# Patient Record
Sex: MALE | Race: WHITE | NOT HISPANIC OR LATINO | Employment: OTHER | URBAN - METROPOLITAN AREA
[De-identification: names, ages, dates, MRNs, and addresses within clinical notes are randomized per-mention and may not be internally consistent; named-entity substitution may affect disease eponyms.]

---

## 2024-04-09 ENCOUNTER — OFFICE VISIT (OUTPATIENT)
Dept: DERMATOLOGY | Facility: CLINIC | Age: 44
End: 2024-04-09
Payer: OTHER GOVERNMENT

## 2024-04-09 DIAGNOSIS — Z02.1 PRE-EMPLOYMENT EXAMINATION: Primary | ICD-10-CM

## 2024-04-09 PROCEDURE — 99202 OFFICE O/P NEW SF 15 MIN: CPT

## 2024-04-09 NOTE — PROGRESS NOTES
Subjective   HPI: Pastor Headley is a 44 y.o. male new patient who presents in office to be evaluated and medically cleared for the .     ROS: No other skin or systemic complaints other than what is documented elsewhere in the note.  Review of Systems   Constitutional: Negative.   HENT: Negative.     Eyes: Negative.    Cardiovascular: Negative.    Respiratory: Negative.     Endocrine: Negative.    Hematologic/Lymphatic: Negative.    Skin: Negative.    Musculoskeletal: Negative.    Gastrointestinal: Negative.    Genitourinary: Negative.    Neurological: Negative.    Psychiatric/Behavioral: Negative.         ALLERGIES: Patient has no allergy information on record.    SOCIAL:  has no history on file for tobacco use, alcohol use, and drug use.    Objective   No acute skin findings today.    Assessment/Plan   Patient has previously had a diagnosis by an outside provider of hidradentitis and was given a dermatology referral for evaluation. Patient was seen by  department of dermatology 1/15/2018 Dr. Abhinav Whipple MD and was diagnosed with Chloracne.  He was treated with benzyl peroxide 5% topical cleanser as well as clindamycin 1% lotion and patient has no longer had any recurrences.  Patient does not have the diagnosis of hidradenitis suppurativa.  From a dermatology standpoint he is medically cleared.      FOLLOW UP: As needed    The patient was encouraged to contact me with any further questions or concerns.  Jessica Nava PA-C  4/11/2024

## 2024-04-09 NOTE — LETTER
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  Patient does not have a diagnosis of hidradenitis and there are no other concerning skin findings today on examination.    If you have any questions or concerns, please don't hesitate to call at [phone].         Sincerely,        DICK Chavarria MD

## 2024-04-11 ASSESSMENT — ENCOUNTER SYMPTOMS
CARDIOVASCULAR NEGATIVE: 1
GASTROINTESTINAL NEGATIVE: 1
CONSTITUTIONAL NEGATIVE: 1
RESPIRATORY NEGATIVE: 1
EYES NEGATIVE: 1
ENDOCRINE NEGATIVE: 1
HEMATOLOGIC/LYMPHATIC NEGATIVE: 1
MUSCULOSKELETAL NEGATIVE: 1
NEUROLOGICAL NEGATIVE: 1
PSYCHIATRIC NEGATIVE: 1

## 2024-04-12 ENCOUNTER — HOSPITAL ENCOUNTER (OUTPATIENT)
Dept: RADIOLOGY | Facility: HOSPITAL | Age: 44
Discharge: HOME | End: 2024-04-12
Payer: OTHER GOVERNMENT

## 2024-04-12 ENCOUNTER — OFFICE VISIT (OUTPATIENT)
Dept: PRIMARY CARE | Facility: CLINIC | Age: 44
End: 2024-04-12
Payer: OTHER GOVERNMENT

## 2024-04-12 ENCOUNTER — OFFICE VISIT (OUTPATIENT)
Dept: ORTHOPEDIC SURGERY | Facility: HOSPITAL | Age: 44
End: 2024-04-12
Payer: OTHER GOVERNMENT

## 2024-04-12 VITALS
SYSTOLIC BLOOD PRESSURE: 118 MMHG | WEIGHT: 190 LBS | HEIGHT: 72 IN | BODY MASS INDEX: 25.73 KG/M2 | TEMPERATURE: 96.4 F | DIASTOLIC BLOOD PRESSURE: 72 MMHG

## 2024-04-12 VITALS — HEIGHT: 72 IN | WEIGHT: 186 LBS | BODY MASS INDEX: 25.19 KG/M2

## 2024-04-12 DIAGNOSIS — M25.521 RIGHT ELBOW PAIN: ICD-10-CM

## 2024-04-12 DIAGNOSIS — Z76.89 ESTABLISHING CARE WITH NEW DOCTOR, ENCOUNTER FOR: Primary | ICD-10-CM

## 2024-04-12 DIAGNOSIS — M25.521 RIGHT ELBOW PAIN: Primary | ICD-10-CM

## 2024-04-12 DIAGNOSIS — R91.8 LUNG NODULES: ICD-10-CM

## 2024-04-12 PROCEDURE — 73070 X-RAY EXAM OF ELBOW: CPT | Mod: RT

## 2024-04-12 PROCEDURE — 84443 ASSAY THYROID STIM HORMONE: CPT | Performed by: INTERNAL MEDICINE

## 2024-04-12 PROCEDURE — 73070 X-RAY EXAM OF ELBOW: CPT | Mod: RIGHT SIDE | Performed by: RADIOLOGY

## 2024-04-12 PROCEDURE — 99204 OFFICE O/P NEW MOD 45 MIN: CPT | Performed by: INTERNAL MEDICINE

## 2024-04-12 PROCEDURE — 80053 COMPREHEN METABOLIC PANEL: CPT | Performed by: INTERNAL MEDICINE

## 2024-04-12 PROCEDURE — 99203 OFFICE O/P NEW LOW 30 MIN: CPT | Performed by: ORTHOPAEDIC SURGERY

## 2024-04-12 PROCEDURE — 85027 COMPLETE CBC AUTOMATED: CPT

## 2024-04-12 PROCEDURE — 36415 COLL VENOUS BLD VENIPUNCTURE: CPT

## 2024-04-12 PROCEDURE — 80061 LIPID PANEL: CPT | Performed by: INTERNAL MEDICINE

## 2024-04-12 PROCEDURE — 99213 OFFICE O/P EST LOW 20 MIN: CPT | Performed by: ORTHOPAEDIC SURGERY

## 2024-04-12 ASSESSMENT — ENCOUNTER SYMPTOMS
OCCASIONAL FEELINGS OF UNSTEADINESS: 0
LOSS OF SENSATION IN FEET: 0
DEPRESSION: 0

## 2024-04-12 ASSESSMENT — PAIN SCALES - GENERAL: PAINLEVEL: 0-NO PAIN

## 2024-04-12 ASSESSMENT — PAIN - FUNCTIONAL ASSESSMENT: PAIN_FUNCTIONAL_ASSESSMENT: NO/DENIES PAIN

## 2024-04-12 NOTE — PROGRESS NOTES
Subjective   Patient ID: Pastor Headley is a 44 y.o. male who presents for Hypertension, Hypothyroidism, and Hyperlipidemia.    HPI   New patient.  44 years old male whom I saw in 2017 for the first time and I saw him now again this year as a new patient with a past medical history of lung nodules.  He had multiple CAT scans on the chest and PET scan and was seen by Dr. PATRICIA HAYWARD who left [facility] to go to [facility].  At that time we did not find any suspicion for cancer of the lung.  He was a cigarette smoker and I made him quit at that time.  Unfortunately today and since then he has been vaping.  Lives in [place] with his wife and 2 kids.  Allergic to all mild symptoms which makes him nauseous and dizzy.  Vaping and no alcohol.  On no prescription medication.  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  On reserve with [employer] he was asking questions about his long CAT scan and findings.  Review of Systems  12 system review reviewed 12 system negative.  He has no specific symptoms of any kind.  Had also issues with his tennis elbow in 2018 and that was cleared.  Objective   /72 (BP Location: Left arm, Patient Position: Sitting, BP Cuff Size: Adult)   Temp 35.8 °C (96.4 °F) (Temporal)   Ht 1.829 m (6')   Wt 86.2 kg (190 lb)   BMI 25.77 kg/m²     Physical Exam  Alert oriented in no distress pleasant cooperative.  Nonicteric sclera no jaundice.  Face symmetrical cranial nerves intact.  Neck supple no masses no lymph node no thyromegaly or jugular venous distention.  Lungs clear no rales wheezing or crackles.  Heart normal S1 and S2 regular rhythm.  Abdomen benign nontender no masses no organomegaly.  Neurological exam intact.  No signs of failure.  No dyspnea on exertion or no chest pain.  No symptoms or history of hemoptysis dyspnea on exertion shortness of breath or any chronic cough.  Agreed to repeat CAT scan of the lung as a follow-up on his lung nodule his last  CAT scan was done in 2019.  I have not seen him since  Assessment/Plan   Diagnoses and all orders for this visit:  Establishing care with new doctor, encounter for  -     Lipid Panel  -     Thyroid Stimulating Hormone  -     Comprehensive Metabolic Panel  -     CBC  Lung nodules  -     CT lung screening follow up CT chest wo IV contrast; Future

## 2024-04-12 NOTE — PROGRESS NOTES
44-year-old male presenting for right elbow evaluation.  He has previously been evaluated for a right elbow injury that was been previously documented as right elbow epicondylitis and tennis elbow.  He was treated back in 2018 and then later again a few years ago with an elbow strap per the records available.  Currently he is having no symptoms and has had no symptoms for years.  He would like his records to reflect that he is asymptomatic and has recovered from this prior to his  medical evaluation.  He has had no need for treatment in his right elbow.  He can lift and use his elbow fully and freely without restriction, pain, or dysfunction.    The patients full medical history, surgical history, medications, allergies, family, medical history, social history, and a complete 30 point review of systems is documented in the medical record on the signed, scanned medical intake sheet or reviewed in the history of present illness.    Gen: The patient is alert and oriented ×3, is in no acute distress, and appear their stated age and weight.    Psychiatric: Mood and affect are appropriate.    Eyes: Sclera are white, and pupils are round and symmetric.    ENT: Mucous membranes are moist.     Neck: Supple. Thyroid is midline.    Respiratory: Respirations are nonlabored, chest rise is symmetric.    Cardiac: Rate is regular by palpation of distal pulses.     Abdomen: Nondistended.    Integument: No obvious cutaneous lesions are noted. No signs of lymphangitis. No signs of systemic edema.    Musculoskeletal examination of his right elbow demonstrates no pain with passive or active range of motion.  Range of motion is full and symmetric to the contralateral side.  Strength is 5/5 in flexion and extension.  Sensation is intact to light touch in the axillary, median, ulnar, and radial nerve distributions distally. The hand is warm and well-perfused.      Multiple views of the right elbow were taken that demonstrate no  signs of elbow arthritis or any lateral elbow injury.    44-year-old male with a history of a remote right elbow injury.  Please let it reflects that this injury is fully recovered and resolved.  There is no need for any restriction medically for remote right elbow injury.    Natural History reviewed. All questions answered. The patient was in agreement with the plan.      **This note was created using voice recognition software and was not corrected for typographical or grammatical errors.**

## 2024-04-12 NOTE — LETTER
April 12, 2024     Patient: Pastor Headley   YOB: 1980   Date of Visit: 4/12/2024       To Whom It May Concern:    It is my medical opinion that Pastor Headley has no active orthopaedic medical issues (including right elbow lateral epicondylitis or tennis elbow) and has no medical restrictions.    If you have any questions or concerns, please don't hesitate to call.         Sincerely,        Theodore Willard MD    CC: No Recipients

## 2024-04-13 LAB
ERYTHROCYTE [DISTWIDTH] IN BLOOD BY AUTOMATED COUNT: 13 % (ref 11.5–14.5)
HCT VFR BLD AUTO: 40.6 % (ref 41–52)
HGB BLD-MCNC: 13.4 G/DL (ref 13.5–17.5)
MCH RBC QN AUTO: 30.2 PG (ref 26–34)
MCHC RBC AUTO-ENTMCNC: 33 G/DL (ref 32–36)
MCV RBC AUTO: 91 FL (ref 80–100)
NRBC BLD-RTO: 0 /100 WBCS (ref 0–0)
PLATELET # BLD AUTO: 384 X10*3/UL (ref 150–450)
RBC # BLD AUTO: 4.44 X10*6/UL (ref 4.5–5.9)
WBC # BLD AUTO: 5.2 X10*3/UL (ref 4.4–11.3)

## 2024-04-17 ENCOUNTER — TELEPHONE (OUTPATIENT)
Dept: PRIMARY CARE | Facility: CLINIC | Age: 44
End: 2024-04-17
Payer: OTHER GOVERNMENT

## 2024-04-17 NOTE — TELEPHONE ENCOUNTER
----- Message from Guilherme Mcqueen MD sent at 4/16/2024  8:05 AM EDT -----  Regarding: R  RESULTS ARE NORMAL, ANY QUESTION CALL ME

## 2024-04-30 ENCOUNTER — HOSPITAL ENCOUNTER (OUTPATIENT)
Dept: RADIOLOGY | Facility: HOSPITAL | Age: 44
Discharge: HOME | End: 2024-04-30
Payer: OTHER GOVERNMENT

## 2024-04-30 DIAGNOSIS — R91.8 LUNG NODULES: ICD-10-CM

## 2024-04-30 PROCEDURE — 71250 CT THORAX DX C-: CPT | Performed by: RADIOLOGY

## 2024-04-30 PROCEDURE — 71250 CT THORAX DX C-: CPT

## 2024-05-20 ENCOUNTER — APPOINTMENT (OUTPATIENT)
Dept: GASTROENTEROLOGY | Facility: HOSPITAL | Age: 44
End: 2024-05-20
Payer: OTHER GOVERNMENT

## 2024-06-09 NOTE — PROGRESS NOTES
History Of Present Illness  Pastor Headley is a 44 y.o. male with h/o HTN, hypothyroidism, hyperlipidemia and lung nodules ( likely benign ) who is here for  Follow up after his last visit back in 2020.    Pt did see Sd Vance NP back in Feb 2020 for abdominal bloating. He ordered an abd xray and recommended FODMAP diet.  He also advised Omeprazole for possible acid reflux, but pt never took it and his sx got better on it's own.    Abd xray was on 2/25/20 did show non obstructive bowel gas pattern. Moderate to large stool burden throughout colon.    Currently, pt is doing well and has no specific GI complaints at this time.  He denies any heartburn, dysphagia, n/v, abd pain, d, c, melena, hematochezia or any unintentional weight loss.       Past Medical History  None    Surgical History  He has a past surgical history that includes Other surgical history (02/25/2020).     Social History  He reports that he has quit smoking. His smoking use included cigarettes. He has never used smokeless tobacco. He reports that he does not drink alcohol and does not use drugs.  He admits to vaping, but no cigarettes    Family History  Maternal grandfather - colon cancer     Allergies  Azithromycin and Erythromycin      Review of Systems   Constitutional:  Negative for appetite change and unexpected weight change.   HENT:  Negative for sore throat and trouble swallowing.    Eyes:  Negative for redness.   Respiratory:  Negative for choking and shortness of breath.    Cardiovascular:  Negative for chest pain.   Gastrointestinal:  Negative for abdominal pain, blood in stool, constipation, diarrhea, nausea and vomiting.   Endocrine: Negative for polyphagia.   Genitourinary:  Negative for dysuria and hematuria.   Musculoskeletal:  Negative for joint swelling.   Skin:  Negative for rash.   Neurological:  Negative for dizziness and weakness.   Psychiatric/Behavioral:  Negative for agitation, confusion and dysphoric mood.           BP  "125/85   Pulse 70   Temp 36.4 °C (97.5 °F)   Ht 1.803 m (5' 11\")   Wt 83.5 kg (184 lb)   SpO2 95%   BMI 25.66 kg/m²   Physical Exam  Vitals reviewed.   Constitutional:       General: He is not in acute distress.     Appearance: Normal appearance. He is normal weight. He is not ill-appearing.   HENT:      Head: Normocephalic and atraumatic.      Mouth/Throat:      Mouth: Mucous membranes are moist.      Pharynx: Oropharynx is clear. No oropharyngeal exudate.   Eyes:      General: No scleral icterus.     Pupils: Pupils are equal, round, and reactive to light.   Cardiovascular:      Rate and Rhythm: Normal rate.   Pulmonary:      Breath sounds: Normal breath sounds.   Abdominal:      General: Bowel sounds are normal. There is no distension.      Palpations: Abdomen is soft.      Tenderness: There is no abdominal tenderness. There is no guarding or rebound.   Musculoskeletal:         General: No swelling or deformity.      Cervical back: Neck supple.   Lymphadenopathy:      Cervical: No cervical adenopathy.   Skin:     General: Skin is warm.      Coloration: Skin is not jaundiced.   Neurological:      General: No focal deficit present.      Mental Status: He is alert. Mental status is at baseline.   Psychiatric:         Mood and Affect: Mood normal.         Behavior: Behavior normal.         Thought Content: Thought content normal.         Relevant Results      Assessment/Plan:      1) h/o abdominal bloating -  Based on history and presentation, and from previous imaging, pt likely had a bout of acute constipation, which has resolved at this time.   He is doing well overall and does not have IBS.    Pt to follow up with as on as needed basis    Cheryl Agrawal PA-C  "

## 2024-06-10 ENCOUNTER — OFFICE VISIT (OUTPATIENT)
Dept: GASTROENTEROLOGY | Facility: HOSPITAL | Age: 44
End: 2024-06-10
Payer: OTHER GOVERNMENT

## 2024-06-10 VITALS
SYSTOLIC BLOOD PRESSURE: 125 MMHG | DIASTOLIC BLOOD PRESSURE: 85 MMHG | TEMPERATURE: 97.5 F | HEART RATE: 70 BPM | WEIGHT: 184 LBS | OXYGEN SATURATION: 95 % | HEIGHT: 71 IN | BODY MASS INDEX: 25.76 KG/M2

## 2024-06-10 DIAGNOSIS — Z87.19 H/O CONSTIPATION: Primary | ICD-10-CM

## 2024-06-10 PROCEDURE — 99203 OFFICE O/P NEW LOW 30 MIN: CPT | Performed by: PHYSICIAN ASSISTANT

## 2024-06-10 PROCEDURE — 99213 OFFICE O/P EST LOW 20 MIN: CPT | Performed by: PHYSICIAN ASSISTANT

## 2024-06-10 ASSESSMENT — ENCOUNTER SYMPTOMS
DIARRHEA: 0
DYSPHORIC MOOD: 0
JOINT SWELLING: 0
AGITATION: 0
POLYPHAGIA: 0
DIZZINESS: 0
WEAKNESS: 0
ABDOMINAL PAIN: 0
DYSURIA: 0
VOMITING: 0
EYE REDNESS: 0
CHOKING: 0
SHORTNESS OF BREATH: 0
CONFUSION: 0
CONSTIPATION: 0
BLOOD IN STOOL: 0
UNEXPECTED WEIGHT CHANGE: 0
SORE THROAT: 0
APPETITE CHANGE: 0
NAUSEA: 0
TROUBLE SWALLOWING: 0
HEMATURIA: 0

## 2024-06-10 ASSESSMENT — PAIN SCALES - GENERAL: PAINLEVEL: 0-NO PAIN

## 2024-06-10 NOTE — PATIENT INSTRUCTIONS
Pt has no diagnosis of IBS.  He was seen once in our GI clinic back in 2020 for abdominal  bloating, which is likely from a bout of constipation (per abd xray on Feb 2020) and now  It has resolved and he has having normal bowel habits and has no specific GI complaints at this time.